# Patient Record
Sex: FEMALE | Race: BLACK OR AFRICAN AMERICAN | URBAN - METROPOLITAN AREA
[De-identification: names, ages, dates, MRNs, and addresses within clinical notes are randomized per-mention and may not be internally consistent; named-entity substitution may affect disease eponyms.]

---

## 2017-04-05 ENCOUNTER — OUTPATIENT (OUTPATIENT)
Dept: OUTPATIENT SERVICES | Facility: HOSPITAL | Age: 82
LOS: 1 days | End: 2017-04-05
Payer: MEDICARE

## 2017-04-05 VITALS
WEIGHT: 154.98 LBS | DIASTOLIC BLOOD PRESSURE: 78 MMHG | RESPIRATION RATE: 16 BRPM | SYSTOLIC BLOOD PRESSURE: 149 MMHG | TEMPERATURE: 98 F | HEART RATE: 67 BPM | OXYGEN SATURATION: 98 % | HEIGHT: 64 IN

## 2017-04-05 DIAGNOSIS — H26.9 UNSPECIFIED CATARACT: Chronic | ICD-10-CM

## 2017-04-05 DIAGNOSIS — D50.9 IRON DEFICIENCY ANEMIA, UNSPECIFIED: ICD-10-CM

## 2017-04-05 DIAGNOSIS — Z90.49 ACQUIRED ABSENCE OF OTHER SPECIFIED PARTS OF DIGESTIVE TRACT: Chronic | ICD-10-CM

## 2017-04-05 DIAGNOSIS — D50.8 OTHER IRON DEFICIENCY ANEMIAS: ICD-10-CM

## 2017-04-05 DIAGNOSIS — Z90.710 ACQUIRED ABSENCE OF BOTH CERVIX AND UTERUS: Chronic | ICD-10-CM

## 2017-04-05 DIAGNOSIS — T82.9XXA UNSPECIFIED COMPLICATION OF CARDIAC AND VASCULAR PROSTHETIC DEVICE, IMPLANT AND GRAFT, INITIAL ENCOUNTER: Chronic | ICD-10-CM

## 2017-04-05 DIAGNOSIS — Z98.890 OTHER SPECIFIED POSTPROCEDURAL STATES: Chronic | ICD-10-CM

## 2017-04-05 DIAGNOSIS — Z01.818 ENCOUNTER FOR OTHER PREPROCEDURAL EXAMINATION: ICD-10-CM

## 2017-04-05 LAB
ANION GAP SERPL CALC-SCNC: 9 MMOL/L — SIGNIFICANT CHANGE UP (ref 5–17)
APTT BLD: 26.3 SEC — LOW (ref 27.5–37.4)
BUN SERPL-MCNC: 35 MG/DL — HIGH (ref 7–18)
CALCIUM SERPL-MCNC: 8.4 MG/DL — SIGNIFICANT CHANGE UP (ref 8.4–10.5)
CHLORIDE SERPL-SCNC: 109 MMOL/L — HIGH (ref 96–108)
CO2 SERPL-SCNC: 23 MMOL/L — SIGNIFICANT CHANGE UP (ref 22–31)
CREAT SERPL-MCNC: 1.79 MG/DL — HIGH (ref 0.5–1.3)
GLUCOSE SERPL-MCNC: 84 MG/DL — SIGNIFICANT CHANGE UP (ref 70–99)
HCT VFR BLD CALC: 30.6 % — LOW (ref 34.5–45)
HGB BLD-MCNC: 9.7 G/DL — LOW (ref 11.5–15.5)
INR BLD: 1.02 RATIO — SIGNIFICANT CHANGE UP (ref 0.88–1.16)
MCHC RBC-ENTMCNC: 30 PG — SIGNIFICANT CHANGE UP (ref 27–34)
MCHC RBC-ENTMCNC: 31.8 GM/DL — LOW (ref 32–36)
MCV RBC AUTO: 94.4 FL — SIGNIFICANT CHANGE UP (ref 80–100)
PLATELET # BLD AUTO: 201 K/UL — SIGNIFICANT CHANGE UP (ref 150–400)
POTASSIUM SERPL-MCNC: 5.2 MMOL/L — SIGNIFICANT CHANGE UP (ref 3.5–5.3)
POTASSIUM SERPL-SCNC: 5.2 MMOL/L — SIGNIFICANT CHANGE UP (ref 3.5–5.3)
PROTHROM AB SERPL-ACNC: 11.1 SEC — SIGNIFICANT CHANGE UP (ref 9.8–12.7)
RBC # BLD: 3.24 M/UL — LOW (ref 3.8–5.2)
RBC # FLD: 13.4 % — SIGNIFICANT CHANGE UP (ref 10.3–14.5)
SODIUM SERPL-SCNC: 141 MMOL/L — SIGNIFICANT CHANGE UP (ref 135–145)
WBC # BLD: 6.9 K/UL — SIGNIFICANT CHANGE UP (ref 3.8–10.5)
WBC # FLD AUTO: 6.9 K/UL — SIGNIFICANT CHANGE UP (ref 3.8–10.5)

## 2017-04-05 PROCEDURE — 71020: CPT | Mod: 26

## 2017-04-05 PROCEDURE — G0463: CPT

## 2017-04-05 PROCEDURE — 85610 PROTHROMBIN TIME: CPT

## 2017-04-05 PROCEDURE — 71046 X-RAY EXAM CHEST 2 VIEWS: CPT

## 2017-04-05 PROCEDURE — 85027 COMPLETE CBC AUTOMATED: CPT

## 2017-04-05 PROCEDURE — 80048 BASIC METABOLIC PNL TOTAL CA: CPT

## 2017-04-05 PROCEDURE — 85730 THROMBOPLASTIN TIME PARTIAL: CPT

## 2017-04-05 RX ORDER — SODIUM CHLORIDE 9 MG/ML
3 INJECTION INTRAMUSCULAR; INTRAVENOUS; SUBCUTANEOUS EVERY 8 HOURS
Qty: 0 | Refills: 0 | Status: DISCONTINUED | OUTPATIENT
Start: 2017-04-07 | End: 2017-04-07

## 2017-04-05 NOTE — H&P PST ADULT - NSANTHOSAYNRD_GEN_A_CORE
No. RONNIE screening performed.  STOP BANG Legend: 0-2 = LOW Risk; 3-4 = INTERMEDIATE Risk; 5-8 = HIGH Risk

## 2017-04-05 NOTE — H&P PST ADULT - PROBLEM SELECTOR PLAN 1
Scheduled for bardport replacement on 4/7/2017. Patient is at moderate risk for planned procedure Scheduled for bardport replacement on 4/7/2017. Preoperative instructions discussed and given to patient. Verbalized understanding of instructions. Instructed to f/u with PCP/Specialists post surgery for management of comorbid conditions

## 2017-04-05 NOTE — H&P PST ADULT - RS GEN PE MLT RESP DETAILS PC
breath sounds equal/good air movement/no intercostal retractions/no chest wall tenderness/no subcutaneous emphysema/no wheezes/no rales/normal/clear to auscultation bilaterally/no rhonchi/respirations non-labored/airway patent

## 2017-04-05 NOTE — H&P PST ADULT - PMH
Chest pain, unspecified type    Diverticulitis    Gastritis, presence of bleeding unspecified, unspecified chronicity, unspecified gastritis type    GERD without esophagitis    Glaucoma    History of hypertension    IBS (irritable bowel syndrome)    Iron deficiency anemia    Migraine    Seasonal allergic rhinitis, unspecified allergic rhinitis trigger Chest pain, unspecified type    CKD (chronic kidney disease), stage 4 (severe)    Diverticulitis    Gastritis, presence of bleeding unspecified, unspecified chronicity, unspecified gastritis type    GERD without esophagitis    Glaucoma    History of hypertension    IBS (irritable bowel syndrome)    Iron deficiency anemia    Migraine    Seasonal allergic rhinitis, unspecified allergic rhinitis trigger

## 2017-04-05 NOTE — H&P PST ADULT - FAMILY HISTORY
Father  Still living? No  Family history of lung cancer, Age at diagnosis: Age Unknown     Mother  Still living? No  Family history of colon cancer in mother, Age at diagnosis: Age Unknown     Child  Still living? No  Personal history of colon cancer, Age at diagnosis: Age Unknown     Sibling  Still living? Yes, Estimated age: Age Unknown  Family history of cancer of digestive system, Age at diagnosis: Age Unknown

## 2017-04-05 NOTE — H&P PST ADULT - HISTORY OF PRESENT ILLNESS
84 y/o female with past medical history of essential hypertension, episodic chest pain, SOB on exertion, seasonal allergic rhinitis, migraine headaches, GERD, gastritis, diverticulosis and chronic iron deficiency anemia presents to PST for presurgical evaluation prior to surgery. Complains of malfunctioning bardport and is scheduled for bardport replacement on 4/7/2017 84 y/o female with past medical history of essential hypertension, episodic chest pain, SOB on exertion, seasonal allergic rhinitis, migraine headaches, GERD, gastritis, diverticulosis and chronic iron deficiency anemia presents to PST for presurgical evaluation prior to surgery. Complains of clotted bardport and is scheduled for bardport replacement on 4/7/2017 84 y/o female with past medical history of essential hypertension, episodic chest pain, SOB on exertion, CKD Stage 1V, seasonal allergic rhinitis, migraine headaches, GERD, gastritis, diverticulosis and chronic iron deficiency anemia presents to PST for presurgical evaluation prior to surgery. Complains of clotted bardport and is scheduled for bardport replacement on 4/7/2017

## 2017-04-05 NOTE — H&P PST ADULT - PRO PAIN LIFE ADAPT
decreased activity level/inability or reluctance to perform ADLs/inability to work/inability to sleep

## 2017-04-05 NOTE — H&P PST ADULT - PSH
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

## 2017-04-05 NOTE — H&P PST ADULT - NEGATIVE GASTROINTESTINAL SYMPTOMS
no vomiting/no nausea/no change in bowel habits/no melena/no diarrhea/no abdominal pain/no constipation

## 2017-04-05 NOTE — H&P PST ADULT - ASSESSMENT
86 y/o female with past medical history of essential hypertension, episodic chest pain, SOB on exertion, seasonal allergic rhinitis, migraine headaches, GERD, gastritis, diverticulosis and chronic iron deficiency anemia scheduled for bardport replacement on 4/7/2017. Patient is at moderate risk for planned surgery 86 y/o female with past medical history of essential hypertension, episodic chest pain, SOB on exertion, CKD Stage 1V, seasonal allergic rhinitis, migraine headaches, GERD, gastritis, diverticulosis and chronic iron deficiency anemia scheduled for bardport replacement on 4/7/2017. Patient is at moderate risk for planned surgery

## 2017-04-05 NOTE — H&P PST ADULT - NEGATIVE GENERAL SYMPTOMS
no anorexia/no polyphagia/no fatigue/no fever/no malaise/no chills/no sweating/no polydipsia/no weight loss/no weight gain/no polyuria

## 2017-04-06 ENCOUNTER — RESULT REVIEW (OUTPATIENT)
Age: 82
End: 2017-04-06

## 2017-04-07 ENCOUNTER — OUTPATIENT (OUTPATIENT)
Dept: OUTPATIENT SERVICES | Facility: HOSPITAL | Age: 82
LOS: 1 days | Discharge: HOME | End: 2017-04-07
Payer: MEDICARE

## 2017-04-07 ENCOUNTER — TRANSCRIPTION ENCOUNTER (OUTPATIENT)
Age: 82
End: 2017-04-07

## 2017-04-07 VITALS
DIASTOLIC BLOOD PRESSURE: 74 MMHG | OXYGEN SATURATION: 94 % | HEART RATE: 88 BPM | RESPIRATION RATE: 18 BRPM | TEMPERATURE: 98 F | SYSTOLIC BLOOD PRESSURE: 134 MMHG

## 2017-04-07 VITALS
WEIGHT: 154.98 LBS | HEART RATE: 73 BPM | SYSTOLIC BLOOD PRESSURE: 148 MMHG | HEIGHT: 64 IN | RESPIRATION RATE: 16 BRPM | TEMPERATURE: 98 F | DIASTOLIC BLOOD PRESSURE: 75 MMHG | OXYGEN SATURATION: 97 %

## 2017-04-07 DIAGNOSIS — Z90.710 ACQUIRED ABSENCE OF BOTH CERVIX AND UTERUS: Chronic | ICD-10-CM

## 2017-04-07 DIAGNOSIS — K57.30 DIVERTICULOSIS OF LARGE INTESTINE WITHOUT PERFORATION OR ABSCESS WITHOUT BLEEDING: ICD-10-CM

## 2017-04-07 DIAGNOSIS — Z90.49 ACQUIRED ABSENCE OF OTHER SPECIFIED PARTS OF DIGESTIVE TRACT: Chronic | ICD-10-CM

## 2017-04-07 DIAGNOSIS — K21.9 GASTRO-ESOPHAGEAL REFLUX DISEASE WITHOUT ESOPHAGITIS: ICD-10-CM

## 2017-04-07 DIAGNOSIS — R07.9 CHEST PAIN, UNSPECIFIED: ICD-10-CM

## 2017-04-07 DIAGNOSIS — K58.9 IRRITABLE BOWEL SYNDROME WITHOUT DIARRHEA: ICD-10-CM

## 2017-04-07 DIAGNOSIS — N18.4 CHRONIC KIDNEY DISEASE, STAGE 4 (SEVERE): ICD-10-CM

## 2017-04-07 DIAGNOSIS — G43.909 MIGRAINE, UNSPECIFIED, NOT INTRACTABLE, WITHOUT STATUS MIGRAINOSUS: ICD-10-CM

## 2017-04-07 DIAGNOSIS — T82.9XXA UNSPECIFIED COMPLICATION OF CARDIAC AND VASCULAR PROSTHETIC DEVICE, IMPLANT AND GRAFT, INITIAL ENCOUNTER: Chronic | ICD-10-CM

## 2017-04-07 DIAGNOSIS — Z98.890 OTHER SPECIFIED POSTPROCEDURAL STATES: Chronic | ICD-10-CM

## 2017-04-07 DIAGNOSIS — D50.8 OTHER IRON DEFICIENCY ANEMIAS: ICD-10-CM

## 2017-04-07 DIAGNOSIS — H26.9 UNSPECIFIED CATARACT: Chronic | ICD-10-CM

## 2017-04-07 DIAGNOSIS — I12.9 HYPERTENSIVE CHRONIC KIDNEY DISEASE WITH STAGE 1 THROUGH STAGE 4 CHRONIC KIDNEY DISEASE, OR UNSPECIFIED CHRONIC KIDNEY DISEASE: ICD-10-CM

## 2017-04-07 DIAGNOSIS — Z45.2 ENCOUNTER FOR ADJUSTMENT AND MANAGEMENT OF VASCULAR ACCESS DEVICE: ICD-10-CM

## 2017-04-07 DIAGNOSIS — D50.9 IRON DEFICIENCY ANEMIA, UNSPECIFIED: ICD-10-CM

## 2017-04-07 DIAGNOSIS — H40.9 UNSPECIFIED GLAUCOMA: ICD-10-CM

## 2017-04-07 PROCEDURE — 88300 SURGICAL PATH GROSS: CPT | Mod: 26

## 2017-04-07 PROCEDURE — 76000 FLUOROSCOPY <1 HR PHYS/QHP: CPT

## 2017-04-07 PROCEDURE — C1788: CPT

## 2017-04-07 PROCEDURE — 88300 SURGICAL PATH GROSS: CPT

## 2017-04-07 PROCEDURE — 36582 REPLACE TUNNELED CV CATH: CPT | Mod: RT

## 2017-04-07 RX ORDER — SODIUM CHLORIDE 9 MG/ML
1000 INJECTION INTRAMUSCULAR; INTRAVENOUS; SUBCUTANEOUS
Qty: 0 | Refills: 0 | Status: DISCONTINUED | OUTPATIENT
Start: 2017-04-07 | End: 2017-04-07

## 2017-04-07 RX ORDER — FENTANYL CITRATE 50 UG/ML
25 INJECTION INTRAVENOUS
Qty: 0 | Refills: 0 | Status: DISCONTINUED | OUTPATIENT
Start: 2017-04-07 | End: 2017-04-07

## 2017-04-07 RX ORDER — ONDANSETRON 8 MG/1
4 TABLET, FILM COATED ORAL ONCE
Qty: 0 | Refills: 0 | Status: DISCONTINUED | OUTPATIENT
Start: 2017-04-07 | End: 2017-04-07

## 2017-04-07 RX ADMIN — SODIUM CHLORIDE 3 MILLILITER(S): 9 INJECTION INTRAMUSCULAR; INTRAVENOUS; SUBCUTANEOUS at 09:38

## 2017-04-07 NOTE — ASU PATIENT PROFILE, ADULT - PMH
Chest pain, unspecified type    CKD (chronic kidney disease), stage 4 (severe)    Diverticulitis    Gastritis, presence of bleeding unspecified, unspecified chronicity, unspecified gastritis type    GERD without esophagitis    Glaucoma    History of hypertension    IBS (irritable bowel syndrome)    Iron deficiency anemia    Migraine    Seasonal allergic rhinitis, unspecified allergic rhinitis trigger

## 2017-04-07 NOTE — DISCHARGE NOTE ADULT - MEDICATION SUMMARY - MEDICATIONS TO TAKE
I will START or STAY ON the medications listed below when I get home from the hospital:    gabapentin 300 mg oral tablet  --  by mouth 3 times a day (after meals)  -- Indication: For as directed    escitalopram 10 mg oral tablet  -- 1 tab(s) by mouth once a day  -- Indication: For as directed    loperamide 2 mg oral tablet  --  by mouth 2 times a day  -- Indication: For as directed    meclizine 12.5 mg oral tablet  -- 1 tab(s) by mouth 3 times a day, As Needed  -- Indication: For as directed    atorvastatin 20 mg oral tablet  -- 1 tab(s) by mouth once a day (at bedtime)  -- Indication: For as directed    NIFEdipine 30 mg oral tablet, extended release  -- 1 tab(s) by mouth once a day  -- Indication: For as directed    Travatan 0.004% ophthalmic solution  -- 1 drop(s) to each affected eye once a day (in the evening)  -- Indication: For as directed    dorzolamide-timolol 2.23%-0.68% ophthalmic solution  -- 1 drop(s) to each affected eye 2 times a day  -- Indication: For as directed    Probiotic Formula oral capsule  -- 1 cap(s) by mouth once a day  -- Indication: For as directed    AcipHex 20 mg oral delayed release tablet  --  by mouth 2 times a day  -- Indication: For as directed    Vitamin D3 1000 intl units oral capsule  -- 1 cap(s) by mouth once a day  -- Indication: For as directed

## 2017-04-07 NOTE — DISCHARGE NOTE ADULT - PATIENT PORTAL LINK FT
“You can access the [facility] Patient Portal, offered by [facility], by registering with the following website: [email or web address]”

## 2017-04-07 NOTE — ASU PATIENT PROFILE, ADULT - PSH
Cataract of both eyes  extraction  Complication associated with vascular device  clotted bardport  History of bladder surgery  for bladder prolapse 1950s  History of laparoscopic cholecystectomy    S/P appendectomy    S/P hysterectomy

## 2017-04-07 NOTE — ASU DISCHARGE PLAN (ADULT/PEDIATRIC). - NOTIFY
Numbness, color, or temperature change to extremity/Swelling that continues/Bleeding that does not stop

## 2017-04-10 LAB — SURGICAL PATHOLOGY FINAL REPORT - CH: SIGNIFICANT CHANGE UP

## 2017-10-31 RX ORDER — MECLIZINE HCL 12.5 MG
1 TABLET ORAL
Qty: 0 | Refills: 0 | COMMUNITY

## 2017-10-31 RX ORDER — DORZOLAMIDE HYDROCHLORIDE TIMOLOL MALEATE 20; 5 MG/ML; MG/ML
1 SOLUTION/ DROPS OPHTHALMIC
Qty: 0 | Refills: 0 | COMMUNITY

## 2017-10-31 RX ORDER — CHOLECALCIFEROL (VITAMIN D3) 125 MCG
1 CAPSULE ORAL
Qty: 0 | Refills: 0 | COMMUNITY

## 2017-10-31 RX ORDER — NIFEDIPINE 30 MG
1 TABLET, EXTENDED RELEASE 24 HR ORAL
Qty: 0 | Refills: 0 | COMMUNITY

## 2017-10-31 RX ORDER — GABAPENTIN 400 MG/1
0 CAPSULE ORAL
Qty: 0 | Refills: 0 | COMMUNITY

## 2017-10-31 RX ORDER — L.ACIDOPH/B.ANIMALIS/B.LONGUM 15B CELL
1 CAPSULE ORAL
Qty: 0 | Refills: 0 | COMMUNITY

## 2022-04-22 NOTE — H&P PST ADULT - ABILITY TO HEAR (WITH HEARING AID OR HEARING APPLIANCE IF NORMALLY USED):
Patient became increasingly dizziness, lightheaded when attempting to ambulate with physical therapy. Assisted patient to a sitting position. BP elevated., Patient not complaining of any pain.     Patient's symptoms improving when in sitting position. Patient assisted to chair with sera steady.     Md notified of increased edema. Per Dr. Naidu, administer furosemide, one time does.   Adequate: hears normal conversation without difficulty

## 2022-10-17 NOTE — ASU PATIENT PROFILE, ADULT - BLOOD TRANSFUSION, PREVIOUS, PROFILE
HPI: This 72 year old female presents today for the arthralgias, worse in the right feet currently.  She has had pain in both of her feet for about 3 weeks.  Insidious in onset with no history of trauma.  She has having difficulty putting shoes on because of pain and swelling.  She is also having difficulty ambulating.  She denies any specific numbness or tingling.    No past medical history on file.  No past surgical history on file.  Current Outpatient Medications   Medication Sig Dispense Refill   • methylPREDNISolone (MEDROL DOSEPAK) 4 MG tablet Take 1 tablet by mouth as directed. follow package directions 1 packet 0   • sulfamethoxazole-trimethoprim (BACTRIM DS) 800-160 MG per tablet Take 1 tablet by mouth 2 times daily for 7 days. 14 tablet 0   • metroNIDAZOLE (FLAGYL) 500 MG tablet Take 1 tablet by mouth 4 times daily for 7 days. 28 tablet 0   • metoPROLOL tartrate (LOPRESSOR) 50 MG tablet Take 50 mg by mouth 2 times daily.     • ondansetron (ZOFRAN) 8 MG tablet Take 8 mg by mouth 2 times daily as needed.     • rosuvastatin (CRESTOR) 40 MG tablet Take 40 mg by mouth at bedtime.     • traMADol (ULTRAM) 50 MG tablet Take 50 mg by mouth 3 times daily as needed.     • famotidine (PEPCID) 20 MG tablet Take 20 mg by mouth 2 times daily.     • dicyclomine (BENTYL) 10 MG capsule Take 10 mg by mouth 4 times daily.     • alendronate (FOSAMAX) 70 MG tablet Take 70 mg by mouth 1 day a week.     • ALPRAZolam (XANAX) 0.5 MG tablet Take 0.5 mg by mouth 2 times daily.       No current facility-administered medications for this visit.     ALLERGIES:   Allergen Reactions   • Penicillins Other (See Comments)     No family history on file.    Social History     Occupational History   • Not on file   Tobacco Use   • Smoking status: Current Every Day Smoker     Packs/day: 0.25     Years: 50.00     Pack years: 12.50     Types: Cigarettes   • Smokeless tobacco: Never Used   Vaping Use   • Vaping Use: never used   Substance and  Sexual Activity   • Alcohol use: Yes     Comment: occasional   • Drug use: Never   • Sexual activity: Not on file        Patient's medications, allergies, past medical, surgical, social and family histories were reviewed and updated as appropriate.    Review of Systems:  Constitutional: negative  Musculoskeletal: positive, see HPI  Neurological: see HPI    Physical Examination:    There were no vitals taken for this visit.  Constitutional:  Well-developed, well-nourished, not in acute distress.  Psychiatric:  Normal affect  Vascular: 2+ and symmetric pedal pulses, capillary refill is brisk.  Skin: Warm, dry, intact without rash or lesion bilateral feet.  Neurologic: Sensation intact to light touch and motor strength intact grossly bilateral feet.  Musculoskeletal: Patient walks with a slow based shuffling gait.  There is mild swelling of both feet with diffuse dorsal tenderness over the extensor tendons.  There is no plantar tenderness.  There is no tenderness palpation over the Achilles tendon.  Full ankle range of motion without pain.    Imaging:     XR FOOT MIN 3 VIEWS BILATERAL    Result Date: 10/11/2022  Narrative: EXAM: XR FOOT MIN 3 VIEWS BILATERAL CLINICAL INDICATION:  Bilateral hip pain COMPARISON:  None.     Impression: FINDINGS/IMPRESSION:  The joint spaces are normal.  No fractures or dislocations are seen.  No bony abnormalities are identified. Electronically Signed by: MASTER CALVERT M.D. Signed on: 10/11/2022 4:02 PM     XR HIP 2 VIEWS RIGHT    Result Date: 10/11/2022  Narrative: EXAM: XR HIP 2 VIEWS RIGHT CLINICAL INDICATION:  Right hip pain COMPARISON:  None.     Impression: FINDINGS/IMPRESSION:  Minimal degenerative changes noted at the right hip joint, with minimal joint space narrowing and small marginal osteophytes.  No acute fracture or dislocation.  No bony lesion.. Electronically Signed by: MASTER CALVERT M.D. Signed on: 10/11/2022 4:03 PM     XR KNEES STANDING BILATERAL AP AND  LAT 3 VIEWS    Result Date: 10/11/2022  Narrative: EXAM: XR KNEES STANDING BILATERAL AP AND LAT 3 VIEWS CLINICAL INDICATION: Bilateral knee pain COMPARISON: 01/02/2017 FINDINGS: There is mild medial compartment joint space narrowing, subchondral sclerosis and osteophyte formation with additional early lateral and patellofemoral osteoarthritis.  No fracture, dislocation or focal suspicious lesions.  Lateral views show minimal trace joint effusions.     Impression: 1.    Mild bilateral osteoarthritis, similar to prior. Electronically Signed by: CROW PARSONS M.D. Signed on: 10/11/2022 4:03 PM     US PELVIS NON-OB TRANSABDOMINAL AND TRANSVAGINAL    Result Date: 9/26/2022  Narrative: EXAM: US PELVIS NON-OB TRANSABDOMINAL AND TRANSVAGINAL     DATE: 09/26/2022 CLINICAL INDICATION: Generalized pain.  There is history of multiple colon surgeries and hysterectomy. COMPARISON: None. FINDINGS: Ultrasound real-time imaging performed by the technologist. Representative images submitted for interpretation. Ultrasound interrogation of the pelvis via transabdominal and endovaginal approach.  The uterus is surgically absent. The adnexa are imaged.  The RIGHT ovary is not visualized, patient is unsure of prior RIGHT oophorectomy.  The LEFT ovary is surgically absent. There are multiple bowel loops.  No discrete mass. No free fluid.     Impression: 1.    The uterus and LEFT ovary are surgically absent. 2.    The RIGHT ovary is not identified. Electronically Signed by: ANI DUENAS D.O. Signed on: 9/26/2022 3:43 PM        Assessment and Plan:  Diagnoses and all orders for this visit:  Foot tendinitis  Other orders  -     methylPREDNISolone (MEDROL DOSEPAK) 4 MG tablet; Take 1 tablet by mouth as directed. follow package directions      Assessment: Bilateral foot pain consistent with extensor tendinitis    Plan: I recommended starting with a Medrol Dosepak given the pain that the patient was experiencing in multiple joints.  I  also recommended shoe modification with well cushioned, supportive shoes.  Patient can follow-up within the next few weeks for further evaluation of her knee and hip pain.    I discussed the above plan in detail with the patient, all questions or concerns were answered and they agreed with plan of care.  We will proceed as detailed above.    The above note was created using Dragon speech recognition technology. Please excuse any typos.    Kapil Storey MD  Orthopaedic Surgeon  [facility]  P: [phone]  F: [phone]   yes

## 2023-02-14 NOTE — ASU PREOP CHECKLIST - PATIENT SENT TO
operating room Ftsg Text: The defect edges were debeveled with a #15 scalpel blade.  Given the location of the defect, shape of the defect and the proximity to free margins a full thickness skin graft was deemed most appropriate.  Using a sterile surgical marker, the primary defect shape was transferred to the donor site. The area thus outlined was incised deep to adipose tissue with a #15 scalpel blade.  The harvested graft was then trimmed of adipose tissue until only dermis and epidermis was left.  The skin margins of the secondary defect were undermined to an appropriate distance in all directions utilizing iris scissors.  The secondary defect was closed with interrupted buried subcutaneous sutures.  The skin edges were then re-apposed with running  sutures.  The skin graft was then placed in the primary defect and oriented appropriately.